# Patient Record
Sex: FEMALE | Race: WHITE | NOT HISPANIC OR LATINO
[De-identification: names, ages, dates, MRNs, and addresses within clinical notes are randomized per-mention and may not be internally consistent; named-entity substitution may affect disease eponyms.]

---

## 2017-04-21 ENCOUNTER — TRANSCRIPTION ENCOUNTER (OUTPATIENT)
Age: 42
End: 2017-04-21

## 2018-03-12 ENCOUNTER — TRANSCRIPTION ENCOUNTER (OUTPATIENT)
Age: 43
End: 2018-03-12

## 2018-06-05 ENCOUNTER — TRANSCRIPTION ENCOUNTER (OUTPATIENT)
Age: 43
End: 2018-06-05

## 2019-05-16 ENCOUNTER — TRANSCRIPTION ENCOUNTER (OUTPATIENT)
Age: 44
End: 2019-05-16

## 2019-11-13 ENCOUNTER — TRANSCRIPTION ENCOUNTER (OUTPATIENT)
Age: 44
End: 2019-11-13

## 2019-12-05 ENCOUNTER — TRANSCRIPTION ENCOUNTER (OUTPATIENT)
Age: 44
End: 2019-12-05

## 2020-02-21 ENCOUNTER — TRANSCRIPTION ENCOUNTER (OUTPATIENT)
Age: 45
End: 2020-02-21

## 2022-04-24 ENCOUNTER — EMERGENCY (EMERGENCY)
Facility: HOSPITAL | Age: 47
LOS: 1 days | Discharge: HOME | End: 2022-04-24
Attending: EMERGENCY MEDICINE | Admitting: EMERGENCY MEDICINE
Payer: COMMERCIAL

## 2022-04-24 VITALS
DIASTOLIC BLOOD PRESSURE: 65 MMHG | HEART RATE: 89 BPM | SYSTOLIC BLOOD PRESSURE: 125 MMHG | RESPIRATION RATE: 18 BRPM | OXYGEN SATURATION: 98 % | TEMPERATURE: 98 F

## 2022-04-24 PROCEDURE — 99284 EMERGENCY DEPT VISIT MOD MDM: CPT

## 2022-04-24 RX ORDER — DEXAMETHASONE 0.5 MG/5ML
10 ELIXIR ORAL ONCE
Refills: 0 | Status: COMPLETED | OUTPATIENT
Start: 2022-04-24 | End: 2022-04-24

## 2022-04-24 RX ADMIN — Medication 10 MILLIGRAM(S): at 16:35

## 2022-04-24 NOTE — ED PROVIDER NOTE - CLINICAL SUMMARY MEDICAL DECISION MAKING FREE TEXT BOX
Rena: Transfer from [facility] ER. Has had coronavirus for approximately 5 days. At that ER, received monoclonal antibodies. Sent for ENT scope because CT scan showed some elisabet-epiglottic edema. Is having a horse voice and sore throat. No evidence of airway obstruction. Will call ENT. Likely give Decadron and admit to the hospital for re-scoping tomorrow because patient is not eligible for CDU on the basis her having Covid. No role for antibiotics, as we know this is a viral infection.

## 2022-04-24 NOTE — ED PROVIDER NOTE - PHYSICAL EXAMINATION
GENERAL: no acute distress, non-toxic appearing, no respiratory distress  HEAD: normocephalic, atraumatic  HEENT: PERRLA, EOMI, normal conjunctiva, oral mucosa moist, mild erythema, airway patent, no neck swelling, no pain to palpation of the neck  CARDIAC: regular rate and rhythm, normal S1 and S2,  no appreciable murmurs  PULM: clear to ascultation bilaterally, no crackles, rales, rhonchi, or wheezing  GI: abdomen nondistended, soft, nontender  NEURO: alert and oriented x 3, normal speech, no gross neurologic deficit  MSK: no visible deformities  SKIN: no visible rashes, dry, well-perfused  PSYCH: appropriate mood and affect

## 2022-04-24 NOTE — ED PROVIDER NOTE - ATTENDING CONTRIBUTION TO CARE
I performed a face-to-face evaluation of the patient and performed a history and physical examination. I agree with the history and physical examination. If this was a PA visit, I personally saw the patient with the PA and performed a substantive portion of the visit including all aspects of the medical decision making.    Transfer from [facility] ER. Has had coronavirus for approximately 5 days. At that ER, received monoclonal antibodies. Sent for ENT scope because CT scan showed some elisabet-epiglottic edema. Is having a horse voice and sore throat. No evidence of airway obstruction. Will call ENT. Likely give Decadron and admit to the hospital for re-scoping tomorrow because patient is not eligible for CDU on the basis her having Covid. No role for antibiotics, as we know this is a viral infection.

## 2022-04-24 NOTE — ED PROVIDER NOTE - OBJECTIVE STATEMENT
Patient is a 46y F PMHx covid+, unvaccinated, presenting form [facility] ED for ENT consult given CT neck results. Patient states she has had throat pain, cough, no fevers. Had neck xray at OSH, followed by CT neck. Sent to [facility] for ENT consult given concern for epiglottitis. Denies CP, SOB, difficulty swallowing, throat swelling, abdominal pain, n/v/d.

## 2022-04-24 NOTE — ED PROVIDER NOTE - PROGRESS NOTE DETAILS
ENT at bedside to scope patient. Report patient has some inflammation consistent with covid+. Epiglottis appears normal. No concern for epiglottitis. Patient was given one dose decadron. Safe to discharge with return precautions. No SOB, no respiratory distress, SpO2 remains >95% on RA.

## 2022-04-24 NOTE — ED PROVIDER NOTE - CARE PLAN
1 Principal Discharge DX:	Viral sore throat  Secondary Diagnosis:	2019 novel coronavirus disease (COVID-19)

## 2022-04-24 NOTE — ED PROVIDER NOTE - NSFOLLOWUPINSTRUCTIONS_ED_ALL_ED_FT
You were seen in the ED for throat pain.    You were seen by an ENT to evaluate your throat. There was some mild inflammation that is consistent with Covid infection.    There is no concern for swelling of the epiglottis.    Follow-up with your primary care doctor in the next 72 hours.     Stay hydrated. Rest.    Take Tylenol and/or Ibuprofen every 4-6 hours as needed for pain.    Isolate from others to avoid spreading the Covid infection.     ***Return to the ED if you have any new or worsening symptoms such as difficulty breathing, throat swelling, wheezing, fevers no relieved by Tylenol, or any other concerning symptoms***

## 2022-04-24 NOTE — ED ADULT TRIAGE NOTE - CHIEF COMPLAINT QUOTE
p/t transferred from [facility] ED for ENT  consult, p/t rested Covid + 4 days ago, c/o of sore throat, difficulty swallowing, denies any airway issues

## 2022-04-24 NOTE — CONSULT NOTE ADULT - SUBJECTIVE AND OBJECTIVE BOX
HPI:  Patient is a 46y Female with PMH significant covid+, unvaccinated, presenting form [facility] ED for ENT consult given CT neck results. Patient states she has had throat pain, cough, no fevers. Had neck xray at OSH showing possible epiglottitis, followed by CT neck which did not show epiglottitis but did have some mild edema of the glottis. Denies CP, SOB, difficulty swallowing, throat swelling, abdominal pain, n/v/d. Reports some odynophagia but no overt dysphagia.       Physical Exam  T(C): 36.7 (04-24-22 @ 15:08), Max: 36.7 (04-24-22 @ 15:08)  HR: 89 (04-24-22 @ 15:08) (89 - 89)  BP: 125/65 (04-24-22 @ 15:08) (125/65 - 125/65)  RR: 18 (04-24-22 @ 15:08) (18 - 18)  SpO2: 98% (04-24-22 @ 15:08) (98% - 98%)  General: NAD, A+Ox3  Resp: No respiratory distress, stridor, or stertor  Voice quality: hoarse  Face:  Symmetric without masses or lesions  OU: EOMI  OC/OP: tongue normal, floor of mouth wnl, no masses or lesions, OP clear 2-3+ tonsils  Neck: b/l shoddy LAD  CNII-XII intact    LARYNGOSCOPY EXAM:     Verbal consent was obtained from patient prior to procedure.    Indication: airway eval    Anesthesia: Afrin spray was applied to the nasal cavities.    Flexible laryngoscopy was performed and revealed the following:    Nasopharynx had no mass or exudate.    Base of tongue was symmetric and not enlarged.    Vallecula was clear    Epiglottis sharp    AE folds erythematous with mild edema    Arytenoids erythematous and mildly edemaous    True vocal folds with mild posterior edema, widely patent    Post cricoid area erythematous    Interarytenoid edema was absent    The patient tolerated the procedure well.      A/P: 46y Female PMH significant covid+, unvaccinated presenting as transfer for CT findings concerning for airway edema. No difficulty breathing and comfortable on exam. FOE shows glottic erythema and mild edema c/w laryngitis. Airway patent.  -can get dose of decadron 10 mg in ED  -supportive care  -return precautions  -no indication for antibiotics at this time      --------------------------------------------------------------  Thank you for the consult,    Viviana Sims MD  Resident  Department of Otolaryngology - Head and Neck Surgery  Peds Page #[phone]  Adult Page #[phone]  ---------------------------------------------------------------

## 2022-04-24 NOTE — ED PROVIDER NOTE - PATIENT PORTAL LINK FT
You can access the FollowMyHealth Patient Portal offered by [facility] by registering at the following website: [email or web address]. By joining [facility]’s FollowMyHealth portal, you will also be able to view your health information using other applications (apps) compatible with our system.

## 2023-02-01 ENCOUNTER — NON-APPOINTMENT (OUTPATIENT)
Age: 48
End: 2023-02-01

## 2023-11-26 ENCOUNTER — NON-APPOINTMENT (OUTPATIENT)
Age: 48
End: 2023-11-26

## 2024-03-12 ENCOUNTER — APPOINTMENT (OUTPATIENT)
Dept: FAMILY MEDICINE | Facility: CLINIC | Age: 49
End: 2024-03-12
Payer: COMMERCIAL

## 2024-03-12 VITALS
HEIGHT: 62 IN | HEART RATE: 86 BPM | RESPIRATION RATE: 16 BRPM | OXYGEN SATURATION: 97 % | DIASTOLIC BLOOD PRESSURE: 74 MMHG | SYSTOLIC BLOOD PRESSURE: 102 MMHG | TEMPERATURE: 98 F | WEIGHT: 210 LBS | BODY MASS INDEX: 38.64 KG/M2

## 2024-03-12 DIAGNOSIS — E66.01 MORBID (SEVERE) OBESITY DUE TO EXCESS CALORIES: ICD-10-CM

## 2024-03-12 DIAGNOSIS — Z83.3 FAMILY HISTORY OF DIABETES MELLITUS: ICD-10-CM

## 2024-03-12 DIAGNOSIS — E78.49 OTHER HYPERLIPIDEMIA: ICD-10-CM

## 2024-03-12 DIAGNOSIS — K58.9 IRRITABLE BOWEL SYNDROME W/OUT DIARRHEA: ICD-10-CM

## 2024-03-12 DIAGNOSIS — D64.9 ANEMIA, UNSPECIFIED: ICD-10-CM

## 2024-03-12 DIAGNOSIS — Z80.3 FAMILY HISTORY OF MALIGNANT NEOPLASM OF BREAST: ICD-10-CM

## 2024-03-12 DIAGNOSIS — Z80.41 FAMILY HISTORY OF MALIGNANT NEOPLASM OF OVARY: ICD-10-CM

## 2024-03-12 DIAGNOSIS — Z00.00 ENCOUNTER FOR GENERAL ADULT MEDICAL EXAMINATION W/OUT ABNORMAL FINDINGS: ICD-10-CM

## 2024-03-12 DIAGNOSIS — K57.90 DIVERTICULOSIS OF INTESTINE, PART UNSPECIFIED, W/OUT PERFORATION OR ABSCESS W/OUT BLEEDING: ICD-10-CM

## 2024-03-12 DIAGNOSIS — Z82.49 FAMILY HISTORY OF ISCHEMIC HEART DISEASE AND OTHER DISEASES OF THE CIRCULATORY SYSTEM: ICD-10-CM

## 2024-03-12 DIAGNOSIS — I25.10 ATHEROSCLEROTIC HEART DISEASE OF NATIVE CORONARY ARTERY W/OUT ANGINA PECTORIS: ICD-10-CM

## 2024-03-12 DIAGNOSIS — R73.03 PREDIABETES.: ICD-10-CM

## 2024-03-12 DIAGNOSIS — N95.1 MENOPAUSAL AND FEMALE CLIMACTERIC STATES: ICD-10-CM

## 2024-03-12 DIAGNOSIS — K21.9 GASTRO-ESOPHAGEAL REFLUX DISEASE W/OUT ESOPHAGITIS: ICD-10-CM

## 2024-03-12 PROCEDURE — G0447 BEHAVIOR COUNSEL OBESITY 15M: CPT | Mod: 59

## 2024-03-12 PROCEDURE — 99386 PREV VISIT NEW AGE 40-64: CPT

## 2024-03-12 PROCEDURE — 96127 BRIEF EMOTIONAL/BEHAV ASSMT: CPT

## 2024-03-12 RX ORDER — EVOLOCUMAB 140 MG/ML
140 INJECTION, SOLUTION SUBCUTANEOUS
Refills: 0 | Status: ACTIVE | COMMUNITY

## 2024-03-12 RX ORDER — ASPIRIN 81 MG/1
81 TABLET, CHEWABLE ORAL DAILY
Refills: 0 | Status: ACTIVE | COMMUNITY

## 2024-03-12 RX ORDER — METOPROLOL SUCCINATE 25 MG/1
25 TABLET, EXTENDED RELEASE ORAL DAILY
Refills: 0 | Status: ACTIVE | COMMUNITY

## 2024-03-12 RX ORDER — PANTOPRAZOLE SODIUM 40 MG/10ML
40 INJECTION, POWDER, FOR SOLUTION INTRAVENOUS
Qty: 30 | Refills: 0 | Status: ACTIVE | COMMUNITY

## 2024-03-12 RX ORDER — ROSUVASTATIN CALCIUM 20 MG/1
20 TABLET, FILM COATED ORAL DAILY
Refills: 0 | Status: ACTIVE | COMMUNITY

## 2024-03-12 RX ORDER — CLOPIDOGREL 75 MG/1
75 TABLET, FILM COATED ORAL DAILY
Refills: 0 | Status: ACTIVE | COMMUNITY

## 2024-03-12 NOTE — PHYSICAL EXAM
[Normal TMs] : both tympanic membranes were normal [Supple] : supple [No Lymphadenopathy] : no lymphadenopathy [Normal] : no respiratory distress, lungs were clear to auscultation bilaterally and no accessory muscle use [No Edema] : there was no peripheral edema [No Extremity Clubbing/Cyanosis] : no extremity clubbing/cyanosis [Soft] : abdomen soft [Non Tender] : non-tender [Non-distended] : non-distended [Normal Anterior Cervical Nodes] : no anterior cervical lymphadenopathy [Normal Supraclavicular Nodes] : no supraclavicular lymphadenopathy [Normal Gait] : normal gait [Normal Affect] : the affect was normal [Alert and Oriented x3] : oriented to person, place, and time [Normal Insight/Judgement] : insight and judgment were intact [de-identified] : Diastasis recti

## 2024-03-12 NOTE — ASSESSMENT
[FreeTextEntry1] : # HM Reviewed AV labs from 3/6/24 done at outside facility Due for breast and cervical ca screening - will see GYN and get scripts Up to date w/ colon ca screening  # CAD s/p PCI (2x stents), familial HLD Cholesterol much improved after starting statin and repatha Last labs 3/6/24 showed , HDL 49, ,  (this was after missing a few weeks of repatha, when on repatha routinely , ) Recent US carotid duplex March 2024 normal Cont following w/ cardio   # Obesity Had very lengthy discussion about weight loss - she is hesistent to try GLP-1 agonist bc she already has GERD and IBS and her GI also believes the medication will exacerbate her symptoms. Maintain healthy exercise regimen Pt should decrease processed foods in diet, cut soda from diet as much as possible, increase raw fruits/vegetables Trial of intermittent fasting in addition to diet changes  # PreDM Last A1c 5.8 <-- 5.9 Weight loss will likely improve this number  f/u in 6 months for weight check

## 2024-03-12 NOTE — COUNSELING
[Benefits of weight loss discussed] : Benefits of weight loss discussed [Encouraged to increase physical activity] : Encouraged to increase physical activity [FreeTextEntry4] : 20 [Needs reinforcement, provided] : Patient needs reinforcement on understanding of disease, goals and obesity follow-up plan; reinforcement was provided

## 2024-03-12 NOTE — HISTORY OF PRESENT ILLNESS
[FreeTextEntry1] : est care, CPE [de-identified] : Pt comes in to est care and get CPE.  Pt has anxiety about exercise due to hx of heart disease. She cleans houses for a living, she is on her feet all day. She follows w/ cardio routinely. They recommended zepbound, but she already has GI issues and is afraid it'll make it worse. She is trying to get to gym, trying to do cardio and weightlifting. Tries to go for walks with her dogs. Has very light breakfast (yogurt), skips lunch, then is very hungry at dinner time and overeats. She also heard of "skinny shot" (metabolism booster, appetite suppressant, etc). Chicken, grilled chicken, baked potato, salad, protein shake.  Perimenopause: last period Jan 2023, she gained weight since she had stents placed in Sept 2022.   CAD s/p 2x stent 9/22, HLD: taking aspirin, statin, repatha, following with cardio. Doesn't eat red meat, does eat some fried foods.

## 2024-03-12 NOTE — HEALTH RISK ASSESSMENT
[No] : In the past 12 months have you used drugs other than those required for medical reasons? 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 She also heard of "skinny shot" (metabolism booster, appetite suppressant, etc). Chicken, grilled chicken, baked potato, salad, protein shake. [[id number]] : 0 [MammogramDate] : 05/23 [MammogramComments] : will make appointment w/ GYN [PapSmearDate] : 05/23 [ColonoscopyDate] : 07/21 [ColonoscopyComments] : Normal (diverticulosis), repeat 07/26

## 2024-05-06 ENCOUNTER — APPOINTMENT (OUTPATIENT)
Dept: ORTHOPEDIC SURGERY | Facility: CLINIC | Age: 49
End: 2024-05-06
Payer: COMMERCIAL

## 2024-05-06 VITALS — WEIGHT: 210 LBS | HEIGHT: 62 IN | BODY MASS INDEX: 38.64 KG/M2

## 2024-05-06 DIAGNOSIS — S83.242A OTHER TEAR OF MEDIAL MENISCUS, CURRENT INJURY, LEFT KNEE, INITIAL ENCOUNTER: ICD-10-CM

## 2024-05-06 DIAGNOSIS — Z95.5 PRESENCE OF CORONARY ANGIOPLASTY IMPLANT AND GRAFT: ICD-10-CM

## 2024-05-06 DIAGNOSIS — M25.462 EFFUSION, LEFT KNEE: ICD-10-CM

## 2024-05-06 DIAGNOSIS — S83.502A SPRAIN OF UNSPECIFIED CRUCIATE LIGAMENT OF LEFT KNEE, INITIAL ENCOUNTER: ICD-10-CM

## 2024-05-06 PROCEDURE — J3490M: CUSTOM

## 2024-05-06 PROCEDURE — 20611 DRAIN/INJ JOINT/BURSA W/US: CPT | Mod: LT

## 2024-05-06 PROCEDURE — 99204 OFFICE O/P NEW MOD 45 MIN: CPT | Mod: 25

## 2024-05-06 NOTE — DISCUSSION/SUMMARY
[de-identified] : General Dx Discussion The patient was advised of the diagnosis. The natural history of the pathology was explained in full to the patient in layman's terms. All questions were answered. The risks and benefits of surgical and non-surgical treatment alternatives were explained in full to the patient.  will get a mri lt knee to eval for ACL and MM tear  f/u after mri

## 2024-05-06 NOTE — PHYSICAL EXAM
[Left] : left knee [NL (0)] : extension 0 degrees [5___] : hamstring 5[unfilled]/5 [Positive] : positive anterior draw [] : patient ambulates without assistive device [TWNoteComboBox7] : flexion 95 degrees

## 2024-05-06 NOTE — REASON FOR VISIT
[FreeTextEntry2] : lt knee eval  In 2013 had a meniscus tear, has not had surgery. In 2019 twisted her lt knee as well as in 2023 where her knee got aggrivated. 3 weeks ago lt knee gave away.  Has mris from 2019 and 2023.

## 2024-05-06 NOTE — DATA REVIEWED
[MRI] : MRI [Left] : left [Report was reviewed and noted in the chart] : The report was reviewed and noted in the chart [FreeTextEntry1] : from 2019 and 2023

## 2024-05-06 NOTE — HISTORY OF PRESENT ILLNESS
[4] : 4 [0] : 0 [Dull/Aching] : dull/aching [Localized] : localized [Constant] : constant [Ice] : ice [Heat] : heat [Walking] : walking [Stairs] : stairs [] : no [FreeTextEntry1] : Left knee  [FreeTextEntry3] : 1/2013 [FreeTextEntry5] : Pt states she has a torn meniscus, recently about 3 weeks ago she got up from a sitting position and felt a pop, today she would like to know other options to prevent surgery [FreeTextEntry6] : weakness [FreeTextEntry9] : Brace [de-identified] : 1 year ago [de-identified] : MRI and x-ray

## 2024-05-07 ENCOUNTER — APPOINTMENT (OUTPATIENT)
Dept: MRI IMAGING | Facility: CLINIC | Age: 49
End: 2024-05-07
Payer: COMMERCIAL

## 2024-05-07 PROCEDURE — 73721 MRI JNT OF LWR EXTRE W/O DYE: CPT | Mod: LT

## 2024-05-08 ENCOUNTER — TRANSCRIPTION ENCOUNTER (OUTPATIENT)
Age: 49
End: 2024-05-08

## 2024-05-14 ENCOUNTER — TRANSCRIPTION ENCOUNTER (OUTPATIENT)
Age: 49
End: 2024-05-14

## 2024-05-16 ENCOUNTER — APPOINTMENT (OUTPATIENT)
Dept: ORTHOPEDIC SURGERY | Facility: CLINIC | Age: 49
End: 2024-05-16
Payer: COMMERCIAL

## 2024-05-16 VITALS — BODY MASS INDEX: 38.64 KG/M2 | HEIGHT: 62 IN | WEIGHT: 210 LBS

## 2024-05-16 DIAGNOSIS — M17.12 UNILATERAL PRIMARY OSTEOARTHRITIS, LEFT KNEE: ICD-10-CM

## 2024-05-16 DIAGNOSIS — S83.282D OTHER TEAR OF LATERAL MENISCUS, CURRENT INJURY, LEFT KNEE, SUBSEQUENT ENCOUNTER: ICD-10-CM

## 2024-05-16 DIAGNOSIS — S83.512D SPRAIN OF ANTERIOR CRUCIATE LIGAMENT OF LEFT KNEE, SUBSEQUENT ENCOUNTER: ICD-10-CM

## 2024-05-16 DIAGNOSIS — M65.9 SYNOVITIS AND TENOSYNOVITIS, UNSPECIFIED: ICD-10-CM

## 2024-05-16 DIAGNOSIS — S83.242A OTHER TEAR OF MEDIAL MENISCUS, CURRENT INJURY, LEFT KNEE, INITIAL ENCOUNTER: ICD-10-CM

## 2024-05-16 DIAGNOSIS — M23.92 UNSPECIFIED INTERNAL DERANGEMENT OF LEFT KNEE: ICD-10-CM

## 2024-05-16 PROCEDURE — 99213 OFFICE O/P EST LOW 20 MIN: CPT

## 2024-05-16 NOTE — DATA REVIEWED
[MRI] : MRI [Left] : left [Knee] : knee [Report was reviewed and noted in the chart] : The report was reviewed and noted in the chart [FreeTextEntry1] : complex medial meniscal tearing and lateral meniscal tearing in the abscence of prior meniscal surgeries. tricompartmental oa. complete chronic acl tear.

## 2024-05-16 NOTE — PHYSICAL EXAM
[Left] : left knee [NL (0)] : extension 0 degrees [5___] : hamstring 5[unfilled]/5 [Positive] : positive anterior draw [] : no pain with varus stress [TWNoteComboBox7] : flexion 100 degrees

## 2024-05-16 NOTE — HISTORY OF PRESENT ILLNESS
[4] : 4 [0] : 0 [Dull/Aching] : dull/aching [Frequent] : frequent [Ice] : ice [Heat] : heat [Full time] : Work status: full time [de-identified] : Patient is here for MRI results of her left knee.  [] : no [FreeTextEntry1] : Left knee [FreeTextEntry9] : brace, elevation [de-identified] : activity [de-identified] : none

## 2024-05-16 NOTE — DISCUSSION/SUMMARY
[de-identified] : General Dx Discussion The patient was advised of the diagnosis. The natural history of the pathology was explained in full to the patient in layman's terms. All questions were answered. The risks and benefits of surgical and non-surgical treatment alternatives were explained in full to the patient.

## 2024-06-22 NOTE — ED PROVIDER NOTE - NS ED MD DISPO DISCHARGE CCDA
Dear Ivanna,     What to do in an Emergency:  If you are experiencing a medical emergency, call 911 immediately. Symptoms that require immediate attention require a visit at Urgent Care ([place]), Immediate Care Center ([place]) or the Emergency Room of a nearby hospital.    When to contact a provider:  Contact a provider if symptoms are worsening or there is no improvement.     Do not have a primary care provider?   If you do not have a primary care provider or have any questions about your visit, please call the [facility] RN at [phone].    Billing Questions:   If you have any billing concerns, please contact our Billing Department at [phone]. Hours: Mon. - Thu. 7:30 am - 6 pm and Friday 7:30 pm to 5 pm.    Thank you for entrusting us with your care.     You can connect by Video with a [facility] Care provider 24/7 for common and non-urgent symptoms. You can also get care by completing an E-Visit questionnaire. Complete a questionnaire by choosing from a list of common health concerns*. A [facility] Care provider will respond to your questionnaire answers within an 1 hour (24/7). You will receive a treatment plan, including a prescription if you need one, and/or testing for COVID, Influenza, Mono, RSV, Strep and urine, depending on your symptoms.     Cold Symptoms Behavioral   Health Skin Concerns Women's and Men's   Health   Everything Else   Cough*    Anxiety* Acne                                 Birth Control Abdominal Discomfort     COVID treatment* Depression *  Bug Bites   Emergency Contraception Acid Reflux   Nasal congestion* Insomnia Cold Sores Erectile Dysfunction                      Excessive Sweating (underarms)          Red/pink eye  Dandruff Smoking Cessation    Headache or migraine*        Sore throat*  Eczema Urinary Symptoms* Joint pain or stiffness       Sinus infection*    Minor Skin Injuries Vaginal Itching*  Medication Refills*         Poison Ivy Vaginal Discharge* Nausea, vomiting and diarrhea          Rash   Neck and Back Pain*       Shingles  Seasonal Allergies          Tick bites                ___________________________________________________________________________________________________________________________    COVID Treatment:       Paxlovid is an oral treatment FDA approved for adults. It is available for mild-moderate COVID-19 infections in patients at high risk of progression to severe COVID-19. This medication has been shown to reduce hospitalization and death by 88%.    Notify your healthcare immediately if,  you have renal disease or impairment, you will receive a lower dose.   you did not list all medications you were taking at the time of your visit.There are several medications that interact with Paxlovid.     Drug interactions:   There are several medication interactions with Paxlovid.   Do not take any additional medication other than what the provider instructed.       Instructions for use:     Paxlovid blister pack contains two medications Nirmatrelvir 300 mg (two 150 mg tablets) with Ritonavir 100 mg, all three tablets taken together twice daily x 5 days.   Missed dose: If a dose is missed within 8 hours of usual administration time, the missed dose should be administered as soon as possible, and normal dosing schedule should resume. If a dose is missed by more than 8 hours, the missed dose should not be administered, and dosing should resume at the next scheduled administration time. Do not double the dose to make up for a missed dose.     Medication Interactions:    If you are using a combined hormonal birth control, please use an effective alternative contraceptive method or an additional barrier method of contraception while taking Paxlovid. Paxlovid may reduce the efficacy of combined hormonal contraceptives.    If you are taking levothyroxine for your thyroid, using Paxlovid together with levothyroxine may decrease the affects of levothyroxine.  Please contact your primary  care provider to have your TSH re-evaluated in 30 days.  Contact your primary care provider if you experience tiredness sluggishness, weakness, memory problems, depression, difficulty concentrating, and/or constipation.    If you are taking a medication for your cholesterol, Paxlovid interacts with statin medications. You will need to stop your statin at least 12 hours prior to starting your course of Paxlovid. 5 days after you have completed your Paxlovid course, you may resume taking your statin.      Side effects:   Diarrhea, high blood pressure, body aches, taste disturbance.    COVID Rebound:  You may experience COVID-19 rebound if you have been diagnosed in the past 2 weeks and have recovered from COVID-19. If you experience COVID-19 rebound, restart the recommended 5-day isolation period. You may end isolation after 5 days if symptoms are improving, no fever for 24 hours or use of  of fever-reducing medication.     There is currently no evidence that additional treatment is needed  in cases where COVID-19 rebound is suspected regardless of vaccination status.    Symptoms Management:     Instructions for Adults:     Take Mucinex (guaifenesin) 600 mg twice daily, this is an expectorant which means it thins the mucus/phlegm so one can blow, cough or spit mucus/phlegm out better.    If needed you may take Sudafed as instructed on the package, if sinus pressure is present (do not take if you have history of high blood pressure/hypertension, are pregnant or breastfeeding).      If needed you may take Tylenol every 4 hours as needed for fever or aches (Do not exceed 4,000mg in 24 hours).    Drink Warm tea with honey.    Salt water gargles and throat lozenges for sore throat.    Instructions for Children:       Give Tylenol or ibuprofen as needed for fever.    Do not give ibuprofen if your child is not drinking enough fluids or are throwing up a lot.   Use Nose Whitney and nasal saline to help suction mucus.  Salt  water gargles if able to follow instructions.  Throat lozenges may be given to children 4 and over for a sore throat. Follow package instructions.   Do not give over the counter cough and cold medications to children under 6 years of age. These medications can have serious side effects.    Use mentholated rubs over the chest and front of neck. This can help soothe a cough.    Honey can also help a cough (do not given to babies under 1 year of age):   1-5 years old give ½ teaspoon.    6-11 years old give 1 teaspoon.   and older give 2 teaspoons of honey.        Instructions for Everyone:     Drink lots of fluids.     Use a cool mist Humidifier.     Use a warm moist compress to sinuses 4-6 times daily to help facilitate sinus drainage.      Sterile saline rinses 3 times daily (nasal saline spray)    Cover coughs or sneezes.      Practice good hand hygiene.                __________________________________________________________________________________________________________________________________________  Quarantine Guidelines:     Contact your employee health or human resources department for further guidance on employer specific quarantine and return to work guidelines. If your school age child has symptoms of COVID or has had a COVID exposure, contact your child's school as they may have additional instructions and requirements to return to school.      COVID POSITIVE:   You can end isolation after 5 full days if you are fever-free for 24 hours without the use of fever-reducing medication and your other symptoms have improved (Loss of taste and smell may persist for weeks or months after recovery and need not delay the end of isolation).         Patient/Caregiver provided printed discharge information.

## 2024-07-01 ENCOUNTER — APPOINTMENT (OUTPATIENT)
Dept: ORTHOPEDIC SURGERY | Facility: CLINIC | Age: 49
End: 2024-07-01

## 2024-07-08 ENCOUNTER — APPOINTMENT (OUTPATIENT)
Dept: ORTHOPEDIC SURGERY | Facility: CLINIC | Age: 49
End: 2024-07-08

## 2024-07-16 ENCOUNTER — NON-APPOINTMENT (OUTPATIENT)
Age: 49
End: 2024-07-16

## 2025-02-07 ENCOUNTER — NON-APPOINTMENT (OUTPATIENT)
Age: 50
End: 2025-02-07

## 2025-06-02 ENCOUNTER — APPOINTMENT (OUTPATIENT)
Dept: FAMILY MEDICINE | Facility: CLINIC | Age: 50
End: 2025-06-02
Payer: COMMERCIAL

## 2025-06-02 ENCOUNTER — NON-APPOINTMENT (OUTPATIENT)
Age: 50
End: 2025-06-02

## 2025-06-02 VITALS
DIASTOLIC BLOOD PRESSURE: 75 MMHG | OXYGEN SATURATION: 98 % | RESPIRATION RATE: 16 BRPM | BODY MASS INDEX: 40.48 KG/M2 | HEART RATE: 70 BPM | TEMPERATURE: 97.5 F | WEIGHT: 220 LBS | SYSTOLIC BLOOD PRESSURE: 114 MMHG | HEIGHT: 62 IN

## 2025-06-02 DIAGNOSIS — S83.242A OTHER TEAR OF MEDIAL MENISCUS, CURRENT INJURY, LEFT KNEE, INITIAL ENCOUNTER: ICD-10-CM

## 2025-06-02 DIAGNOSIS — E66.01 OBESITY, CLASS 3: ICD-10-CM

## 2025-06-02 DIAGNOSIS — M65.969 UNSP SYNOVITIS AND TENOSYNOVITIS, UNSPECIFIED LOWER LEG: ICD-10-CM

## 2025-06-02 DIAGNOSIS — K21.9 GASTRO-ESOPHAGEAL REFLUX DISEASE W/OUT ESOPHAGITIS: ICD-10-CM

## 2025-06-02 DIAGNOSIS — N95.1 MENOPAUSAL AND FEMALE CLIMACTERIC STATES: ICD-10-CM

## 2025-06-02 DIAGNOSIS — E78.49 OTHER HYPERLIPIDEMIA: ICD-10-CM

## 2025-06-02 DIAGNOSIS — S83.512D SPRAIN OF ANTERIOR CRUCIATE LIGAMENT OF LEFT KNEE, SUBSEQUENT ENCOUNTER: ICD-10-CM

## 2025-06-02 DIAGNOSIS — S83.502A SPRAIN OF UNSPECIFIED CRUCIATE LIGAMENT OF LEFT KNEE, INITIAL ENCOUNTER: ICD-10-CM

## 2025-06-02 DIAGNOSIS — E66.812 OBESITY, CLASS 2: ICD-10-CM

## 2025-06-02 DIAGNOSIS — E66.01 OBESITY, CLASS 2: ICD-10-CM

## 2025-06-02 DIAGNOSIS — S83.282D OTHER TEAR OF LATERAL MENISCUS, CURRENT INJURY, LEFT KNEE, SUBSEQUENT ENCOUNTER: ICD-10-CM

## 2025-06-02 DIAGNOSIS — Z00.00 ENCOUNTER FOR GENERAL ADULT MEDICAL EXAMINATION W/OUT ABNORMAL FINDINGS: ICD-10-CM

## 2025-06-02 DIAGNOSIS — K58.9 IRRITABLE BOWEL SYNDROME, UNSPECIFIED: ICD-10-CM

## 2025-06-02 DIAGNOSIS — R73.03 PREDIABETES.: ICD-10-CM

## 2025-06-02 DIAGNOSIS — E66.813 OBESITY, CLASS 3: ICD-10-CM

## 2025-06-02 DIAGNOSIS — M25.462 EFFUSION, LEFT KNEE: ICD-10-CM

## 2025-06-02 DIAGNOSIS — I25.10 ATHEROSCLEROTIC HEART DISEASE OF NATIVE CORONARY ARTERY W/OUT ANGINA PECTORIS: ICD-10-CM

## 2025-06-02 DIAGNOSIS — Z78.0 ASYMPTOMATIC MENOPAUSAL STATE: ICD-10-CM

## 2025-06-02 PROCEDURE — 96127 BRIEF EMOTIONAL/BEHAV ASSMT: CPT

## 2025-06-02 PROCEDURE — G0447 BEHAVIOR COUNSEL OBESITY 15M: CPT | Mod: 59

## 2025-06-02 PROCEDURE — 99214 OFFICE O/P EST MOD 30 MIN: CPT | Mod: 25

## 2025-06-02 PROCEDURE — 99396 PREV VISIT EST AGE 40-64: CPT

## 2025-06-06 RX ORDER — SERTRALINE 25 MG/1
25 TABLET, FILM COATED ORAL
Qty: 90 | Refills: 1 | Status: ACTIVE | COMMUNITY
Start: 2025-06-06 | End: 1900-01-01

## 2025-08-04 ENCOUNTER — APPOINTMENT (OUTPATIENT)
Dept: FAMILY MEDICINE | Facility: CLINIC | Age: 50
End: 2025-08-04

## 2025-08-21 ENCOUNTER — NON-APPOINTMENT (OUTPATIENT)
Age: 50
End: 2025-08-21